# Patient Record
Sex: FEMALE | Race: WHITE
[De-identification: names, ages, dates, MRNs, and addresses within clinical notes are randomized per-mention and may not be internally consistent; named-entity substitution may affect disease eponyms.]

---

## 2017-08-14 ENCOUNTER — HOSPITAL ENCOUNTER (EMERGENCY)
Dept: HOSPITAL 10 - FTE | Age: 27
Discharge: HOME | End: 2017-08-14
Payer: MEDICAID

## 2017-08-14 VITALS
BODY MASS INDEX: 35.16 KG/M2 | BODY MASS INDEX: 35.16 KG/M2 | WEIGHT: 198.42 LBS | HEIGHT: 63 IN | HEIGHT: 63 IN | WEIGHT: 198.42 LBS

## 2017-08-14 VITALS
TEMPERATURE: 99.2 F | RESPIRATION RATE: 18 BRPM | DIASTOLIC BLOOD PRESSURE: 58 MMHG | HEART RATE: 101 BPM | SYSTOLIC BLOOD PRESSURE: 108 MMHG

## 2017-08-14 DIAGNOSIS — E86.0: ICD-10-CM

## 2017-08-14 DIAGNOSIS — J03.90: Primary | ICD-10-CM

## 2017-08-14 LAB
ADD UMIC: YES
COLOR UR: YELLOW
GLUCOSE UR STRIP-MCNC: NEGATIVE MG/DL
KETONES UR STRIP.AUTO-MCNC: NEGATIVE MG/DL
MUCOUS THREADS #/AREA URNS HPF: (no result) /HPF
NITRITE UR QL STRIP.AUTO: NEGATIVE MG/DL
RBC # UR AUTO: (no result) MG/DL
SQUAMOUS #/AREA URNS HPF: (no result) /HPF
UR ASCORBIC ACID: 20 MG/DL
UR BILIRUBIN (DIP): NEGATIVE MG/DL
UR CLARITY: (no result)
UR PH (DIP): 5 (ref 5–9)
UR RBC: 8 /HPF (ref 0–5)
UR SPECIFIC GRAVITY (DIP): 1.02 (ref 1–1.03)
UR TOTAL PROTEIN (DIP): (no result) MG/DL
UROBILINOGEN UR STRIP-ACNC: NEGATIVE MG/DL
WBC # UR STRIP: (no result) LEU/UL

## 2017-08-14 PROCEDURE — 96374 THER/PROPH/DIAG INJ IV PUSH: CPT

## 2017-08-14 PROCEDURE — 81001 URINALYSIS AUTO W/SCOPE: CPT

## 2017-08-14 PROCEDURE — 71010: CPT

## 2017-08-14 NOTE — RADRPT
PROCEDURE:   XR Chest. 

 

CLINICAL INDICATION:   Fevers. 

 

TECHNIQUE:   Single frontal chest x-ray. 

 

COMPARISON:   None. 

 

FINDINGS:

 

The lungs are clear.  No focal opacification is seen.  The cardiomediastinal silhouette is unremarka
ble.  The osseous structures are unremarkable.   

 

IMPRESSION:

 

1.  There is no acute cardiopulmonary process.

 

 

RPTAT: PP

_____________________________________________ 

.Joo Magana MD, MD           Date    Time 

Electronically viewed and signed by .Joo Magana MD, MD on 08/14/2017 19:23 

 

D:  08/14/2017 19:23  T:  08/14/2017 19:23

.B/

## 2017-08-14 NOTE — ERA
ER Documentation


Chief Complaint


Date/Time


DATE: 17 


TIME: 18:08


Chief Complaint


PT with fever, body aches, ST an dcongestions X 2 days.





HPI


The patient is a 27-year-old female, presenting to the ER because of fever, 

sore throat for 1 day, denies facial pain, neck pain, chest pain, dyspnea, 

abdominal pain, vomiting with dysuria, diarrhea.  She does not smoke or drink





Past medical history: None


Past surgical history: , cholecystectomy





ROS


All systems reviewed and are negative except as per history of present illness.





Medications


Home Meds


Active Scripts


Ibuprofen* (Motrin*) 600 Mg Tab, 600 MG PO Q6, #20 TAB


   Prov:JANE SALES MD         17


Azithromycin* (Zithromax*) 250 Mg Tablet, 250 MG PO .ZPACK AS DIRECTED, #6 TAB


   TAKE 500 MG (2 TABS) THE FIRST DAY THEN 250 MG (1 TAB) DAYS 2-5


   Prov:JANE SALES MD         17


Reported Medications


Acetaminophen* (Tylenol*) 325 Mg Tab, 325 MG PO AS NEEDED FOR PAIN


   11


[Pain Pills Unknown]   No Conflict Check


   11


[Denies]   No Conflict Check


   11





Allergies


Allergies:  


Coded Allergies:  


     Amoxicillin (Verified  Allergy, Mild, 11)


     Penicillins (Verified  Allergy, Mild, 11)





PMhx/Soc


History of Surgery:  Yes (C SECTION X1)


Anesthesia Reaction:  No


Hx Neurological Disorder:  No


Hx Respiratory Disorders:  No


Hx Cardiac Disorders:  No


Hx Psychiatric Problems:  No


Hx Miscellaneous Medical Probl:  No


Hx Alcohol Use:  No


Hx Substance Use:  No


Hx Tobacco Use:  No





Physical Exam


Vitals





Vital Signs








  Date Time  Temp Pulse Resp B/P Pulse Ox O2 Delivery O2 Flow Rate FiO2


 


17 17:43 104.0 130 18 127/68 98   








Physical Exam


 Const:      No acute distress.


 Head:        Atraumatic.


 Eyes:       Normal Conjunctiva.


 ENT:         Normal External Ears, Nose and Mouth.  Tonsils are edematous, 

erythematous, exudate


 Neck:        Full range of motion.  No meningismus.


 Resp:         Clear to auscultation bilaterally.


 Cardio:       Regular tachycardic


 Abd:         Soft,  non distended, normal bowel sounds, non tender.


 Skin:         No petechiae or rashes.


 Back:        No midline or flank tenderness.


 Ext:          No cyanosis, or edema.


 Neur:        Awake and alert. No focal deficit


 Psych:        Normal Mood and Affect.


Results 24 hrs








 Laboratory Tests








Test


  17


18:35


 


Urine Color YELLOW 


 


Urine Clarity


  SLIGHTLY


CLOUDY


 


Urine pH 5.0 


 


Urine Specific Gravity 1.019 


 


Urine Ketones NEGATIVEmg/dL 


 


Urine Nitrite NEGATIVEmg/dL 


 


Urine Bilirubin NEGATIVEmg/dL 


 


Urine Urobilinogen NEGATIVEmg/dL 


 


Urine Leukocyte Esterase TRACELeu/ul 


 


Urine Microscopic RBC 8/HPF 


 


Urine Microscopic WBC 6/HPF 


 


Urine Squamous Epithelial


Cells FEW/HPF 


 


 


Urine Mucus FEW/HPF 


 


Urine Hemoglobin 2+mg/dL 


 


Urine Glucose NEGATIVEmg/dL 


 


Urine Total Protein 1+mg/dl 








 Current Medications








 Medications


  (Trade)  Dose


 Ordered  Sig/Neeta


 Route


 PRN Reason  Start Time


 Stop Time Status Last Admin


Dose Admin


 


 Acetaminophen 650


 mg  650 mg  ONCE  ONCE


 PO


   17 18:30


 17 18:31 DC 17 18:29


 


 


 Sodium Chloride  1,000 ml @ 


 1,000 mls/hr  Q1H ONCE


 IV


   17 18:30


 17 19:29 DC 17 18:34


 


 


 Sodium Chloride


  (NS)  1,000 ml @ 


 1,000 mls/hr  Q1H ONCE


 IV


   17 18:30


 17 19:29 DC 17 18:41


 


 


 Ketorolac


 Tromethamine


  (Toradol)  30 mg  ONCE  STAT


 IV


   17 19:47


 17 19:48 DC 17 19:53


 














Procedures/[place] [id number]


 Radiology Main Line: [phone]





 DIAGNOSTIC IMAGING REPORT





 Patient: BRADEN AGUSTIN   : 1990   Age: 27  Sex: F                

        


 MR #:    [id number]   Acct #:   [id number]    DOS: 17 0000


 Ordering MD: JANE SALES MD   Location:  [facility]   Room/Bed:                  

                          


 








PROCEDURE:   XR Chest. 


 


CLINICAL INDICATION:   Fevers. 


 


TECHNIQUE:   Single frontal chest x-ray. 


 


COMPARISON:   None. 


 


FINDINGS:


 


The lungs are clear.  No focal opacification is seen.  The cardiomediastinal 

silhouette is unremarkable.  The osseous structures are unremarkable.   


 


IMPRESSION:


 


1.  There is no acute cardiopulmonary process.


 


 


RPTAT: PP


_____________________________________________ 


.Joo Magana MD, MD           Date    Time 


Electronically viewed and signed by .Joo Magana MD, MD on 2017 19:23 


 


D:  2017 19:23  T:  2017 19:23


.B/





CC: JANE SALES MD





MEDICAL MAKING DECISION: The patient is a 26-year-old female, presenting with 

acute tonsillitis, acute dehydration.  She was treated with 2 L normal saline 

for acute dehydration, Tylenol for fever and Toradol 30 mg IV for pain with 

good response





The differential diagnoses considered include but are not limited to pneumonia, 

cystitis, pyelonephritis





Departure


Diagnosis:  


 Primary Impression:  


 Tonsillitis


 Additional Impression:  


 Dehydration


Condition:  Good


Comments


She was discharged with amoxicillin and Motrin





I discussed the findings with the patient. I advised the patient to follow-up 

with the primary physician in about 1-2 days, sooner if needed and return if 

any concern.











JANE SALES MD Aug 14, 2017 18:08

## 2017-08-17 ENCOUNTER — HOSPITAL ENCOUNTER (EMERGENCY)
Age: 27
Discharge: HOME | End: 2017-08-17

## 2017-08-17 DIAGNOSIS — L50.9: ICD-10-CM

## 2017-08-17 DIAGNOSIS — J03.90: Primary | ICD-10-CM

## 2017-08-19 ENCOUNTER — HOSPITAL ENCOUNTER (EMERGENCY)
Dept: HOSPITAL 10 - FTE | Age: 27
Discharge: HOME | End: 2017-08-19
Payer: COMMERCIAL

## 2017-08-19 VITALS
WEIGHT: 190.39 LBS | HEIGHT: 63 IN | BODY MASS INDEX: 33.73 KG/M2 | HEIGHT: 63 IN | WEIGHT: 190.39 LBS | BODY MASS INDEX: 33.73 KG/M2

## 2017-08-19 VITALS — RESPIRATION RATE: 16 BRPM | DIASTOLIC BLOOD PRESSURE: 59 MMHG | HEART RATE: 86 BPM | SYSTOLIC BLOOD PRESSURE: 132 MMHG

## 2017-08-19 DIAGNOSIS — J03.90: Primary | ICD-10-CM

## 2017-08-19 LAB
ANION GAP SERPL CALC-SCNC: 20 MMOL/L (ref 8–16)
BASOPHILS # BLD AUTO: 0 10^3/UL (ref 0–0.1)
BASOPHILS NFR BLD: 0.3 % (ref 0–2)
BLAST% (M): 3 % (ref 0–0)
BLASTS # BLD MANUAL: 0 10^3/UL (ref 0–0)
BUN SERPL-MCNC: 13 MG/DL (ref 7–20)
CALCIUM SERPL-MCNC: 9.3 MG/DL (ref 8.4–10.2)
CHLORIDE SERPL-SCNC: 99 MMOL/L (ref 97–110)
CO2 SERPL-SCNC: 29 MMOL/L (ref 21–31)
CREAT SERPL-MCNC: 0.62 MG/DL (ref 0.44–1)
EOSINOPHIL # BLD: 0.3 10^3/UL (ref 0–0.5)
EOSINOPHIL NFR BLD MANUAL: 4 % (ref 0–7)
EOSINOPHIL NFR BLD: 1.4 % (ref 0–7)
ERYTHROCYTE [DISTWIDTH] IN BLOOD BY AUTOMATED COUNT: 12.3 % (ref 11.5–14.5)
GLUCOSE SERPL-MCNC: 93 MG/DL (ref 70–220)
HCT VFR BLD CALC: 34.6 % (ref 37–47)
HGB BLD-MCNC: 11.8 G/DL (ref 12–16)
LYMPHOCYTES # BLD AUTO: 2 10^3/UL (ref 0.8–2.9)
LYMPHOCYTES NFR BLD AUTO: 28.6 % (ref 15–51)
MCH RBC QN AUTO: 27.8 PG (ref 29–33)
MCHC RBC AUTO-ENTMCNC: 34.1 G/DL (ref 32–37)
MCV RBC AUTO: 81.4 FL (ref 82–101)
MONOCYTES # BLD: 0.3 10^3/UL (ref 0.3–0.9)
MONOCYTES % (M): 5 % (ref 0–11)
MONOCYTES NFR BLD: 5.2 % (ref 0–11)
NEUTROPHILS # BLD: 3.7 10^3/UL (ref 1.6–7.5)
NEUTROPHILS NFR BLD AUTO: 63.9 % (ref 39–77)
NRBC # BLD MANUAL: 0 10^3/UL (ref 0–0)
NRBC BLD AUTO-RTO: 0 /100WBC (ref 0–0)
PLATELET # BLD: 288 10^3/UL (ref 140–415)
PMV BLD AUTO: 8.9 FL (ref 7.4–10.4)
POSITIVE DIFF: (no result)
POTASSIUM SERPL-SCNC: 3.4 MMOL/L (ref 3.5–5.1)
RBC # BLD AUTO: 4.25 10^6/UL (ref 4.2–5.4)
SODIUM SERPL-SCNC: 145 MMOL/L (ref 135–144)
WBC # BLD AUTO: 6.5 10^3/UL (ref 4.8–10.8)

## 2017-08-19 PROCEDURE — 36415 COLL VENOUS BLD VENIPUNCTURE: CPT

## 2017-08-19 PROCEDURE — 87880 STREP A ASSAY W/OPTIC: CPT

## 2017-08-19 PROCEDURE — 85025 COMPLETE CBC W/AUTO DIFF WBC: CPT

## 2017-08-19 PROCEDURE — 80048 BASIC METABOLIC PNL TOTAL CA: CPT

## 2017-08-19 PROCEDURE — 70491 CT SOFT TISSUE NECK W/DYE: CPT

## 2017-08-19 NOTE — RADRPT
PROCEDURE:   CT neck with contrast. 

 

CLINICAL INDICATION:   Swelling and difficulty swallowing. 

 

TECHNIQUE:   CT of the neck was performed on a multi-detector high-resolution CT scanner.  Contiguou
s axial images were obtained after the administration of 90 cc Omnipaque 300 intravenous contrast.  
Coronal and sagittal reformatted images were obtained. Images were reviewed on a PACS workstation.

 

One or more of the following dose reduction techniques were used:

- Automated exposure control.

- Adjustment of the mA and/or kV according to patient size.

- Use of iterative reconstruction technique.

 

Exam CTD/vol = 9.52 mGy.

Total exam DLP = 227.33 mGy-cm.  

 

COMPARISON:   None. 

 

FINDINGS:

There are enlarged tonsils and adenoids.  There is no abnormal mass or fluid collection identified. 
 The nasopharynx, oropharynx, hypopharynx and larynx are otherwise within normal limits.  The tongue
 and tongue base are within normal limits.  The vallecula and piriform sinuses are within normal ervin
its.  Bilateral parotid and submandibular glands are within normal limits.  There are enlarged jugul
odigastric and submandibular lymph nodes bilaterally with the largest within the right cervical leve
l II measuring 2.1 x 2.0 cm.  The thyroid gland is unremarkable.  There is minimal mucoperiosteal di
sease within bilateral maxillary and sphenoid sinuses.  Bilateral mastoid air cells are clear.  The 
lung apices are clear. 

 

IMPRESSION:

Enlarged tonsils and adenoids. There is no abnormal mass or fluid collection identified.

 

Mild to moderate cervical lymphadenopathy.

 

Minimal paranasal sinus inflammatory disease.

_____________________________________________ 

.Naga Finney MD, MD           Date    Time 

Electronically viewed and signed by .Naga Finney MD, MD on 08/19/2017 05:43 

 

D:  08/19/2017 05:43  T:  08/19/2017 05:43

.T/

## 2017-08-19 NOTE — ERD
ER Documentation


Chief Complaint


Date/Time


DATE: 17 


TIME: 05:11


Chief Complaint


swollen upper lip&rash over arms & tummy today,no SOB,on Cipro&Z-pack





HPI


This is a 27-year-old female presenting to the emergency department with sore 

throat and neck pain x 1 week.  Patient states she was here twice before and 

was started on 2 different antibiotics.  Patient states the first time she was 

here she was started on azithromycin and developed a pruritic rash to neck and 

chest.  Patient was then switched to clindamycin and continues to have rash 

that is spreading and swelling to upper lip and face.  No difficulty swallowing 

or drooling.  No difficulty breathing or shortness of breath.  Patient states 

she continues to have sore throat.  Patient has muffled voice.  Patient talking 

complete sentences.  No fevers or chills.





ROS


All systems reviewed and are negative except as per history of present illness.





Medications


Home Meds


Active Scripts


Hydrocodone/Acetaminophen (Norco 5-325 Tablet) 1 Each Tablet, 1 TAB PO Q6H Y 

for PAIN, #15 TAB


   Prov:MUSA WILDE NP         17


Triamcinolone Acetonide (Triamcinolone Acetonide) 0.1% - 15 Gm Cream.gm., 1 

APPLIC TOP BID, #1 TUB


   Prov:ANGY SANCHEZ PA-C         17


Acetaminophen* (Tylophen*) 500 Mg Capsule, 1 CAP PO Q6H Y for PAIN AND OR 

ELEVATED TEMP, #20 CAP


   Prov:ANGY SANCHEZ-C         17


Famotidine* (Pepcid*) 20 Mg Tablet, 20 MG PO BID for 4 Days, TAB


   Prov:ANGY SANCHEZ-C         17


Diphenhydramine Hcl* (Benadryl*) 25 Mg Cap, 25 MG PO Q6, #30 CAP


   Prov:ANGY SANCHEZ-HARLEEN         17


Clindamycin Hcl* (Clindamycin Hcl*) 300 Mg Capsule, 300 MG PO TID for 10 Days, 

CAP


   Prov:ANGY SANCHEZ-C         17


Ibuprofen* (Motrin*) 600 Mg Tab, 600 MG PO Q6, #20 TAB


   Prov:JANE SALES MD         17


Azithromycin* (Zithromax*) 250 Mg Tablet, 250 MG PO .ZPACK AS DIRECTED, #6 TAB


   TAKE 500 MG (2 TABS) THE FIRST DAY THEN 250 MG (1 TAB) DAYS 2-5


   Prov:JANE SALES MD         17


Reported Medications


Acetaminophen* (Tylenol*) 325 Mg Tab, 325 MG PO AS NEEDED FOR PAIN


   11


[Pain Pills Unknown]   No Conflict Check


   11


[Denies]   No Conflict Check


   11





Allergies


Allergies:  


Coded Allergies:  


     Amoxicillin (Verified  Allergy, Mild, 11)


     Penicillins (Verified  Allergy, Mild, 11)





PMhx/Soc


History of Surgery:  Yes (C SECTION X2, gallbladder)


Anesthesia Reaction:  No


Hx Neurological Disorder:  No


Hx Respiratory Disorders:  No


Hx Cardiac Disorders:  No


Hx Psychiatric Problems:  No


Hx Miscellaneous Medical Probl:  No


Hx Alcohol Use:  No


Hx Substance Use:  No


Hx Tobacco Use:  No


Smoking Status:  Never smoker





Physical Exam


Vitals





Vital Signs








  Date Time  Temp Pulse Resp B/P Pulse Ox O2 Delivery O2 Flow Rate FiO2


 


17 01:14 97.1 93 18 120/58 97   








Physical Exam


Const:              No acute distress, alert


Head:   Atraumatic 


Eyes:    Normal Conjunctiva


ENT:    Normal External Ears, Nose and Mouth.  Kissing tonsils, exudate and 

erythema to bilateral tonsils.  No peritonsillar abscess. 


Neck:               Full range of motion..~ No meningismus.


Resp:    Clear to auscultation bilaterally


Cardio:    Regular rate and rhythm, no murmurs


Abd:    Soft, non tender, non distended. Normal bowel sounds


Skin:    No petechiae or rashes


Back:    No midline or flank tenderness


Ext:    No cyanosis, or edema


Neur:    Awake and alert


Psych:    Normal Mood and Affect


Result Diagram:  


17 0348                                                                   

             17 0348





Results 24 hrs





 Laboratory Tests








Test


  17


03:48


 


White Blood Count 6.510^3/ul 


 


Red Blood Count 4.2510^6/ul 


 


Hemoglobin 11.8g/dl 


 


Hematocrit 34.6% 


 


Mean Corpuscular Volume 81.4fl 


 


Mean Corpuscular Hemoglobin 27.8pg 


 


Mean Corpuscular Hemoglobin


Concent 34.1g/dl 


 


 


Red Cell Distribution Width 12.3% 


 


Platelet Count 99984^3/UL 


 


Mean Platelet Volume 8.9fl 


 


Neutrophils % % 


 


Lymphocytes % % 


 


Monocytes % % 


 


Eosinophils % % 


 


Basophils % % 


 


Nucleated Red Blood Cells % 0.0/100WBC 


 


Neutrophils # (Manual) 410^3/ul 


 


Lymphocytes # 10^3/ul 


 


Monocytes # 10^3/ul 


 


Eosinophils # 10^3/ul 


 


Basophils # 10^3/ul 


 


Nucleated Red Blood Cells # 10^3/ul 


 


Sodium Level 145mmol/L 


 


Potassium Level 3.4mmol/L 


 


Chloride Level 99mmol/L 


 


Carbon Dioxide Level 29mmol/L 


 


Anion Gap 20 


 


Blood Urea Nitrogen 13mg/dl 


 


Creatinine 0.62mg/dl 


 


Glucose Level 93mg/dl 


 


Calcium Level 9.3mg/dl 








 Current Medications








 Medications


  (Trade)  Dose


 Ordered  Sig/Neeta


 Route


 PRN Reason  Start Time


 Stop Time Status Last Admin


Dose Admin


 


 IV Flush 10 ml  10 ml  STK-MED ONCE


 .ROUTE


   17 05:10


 17 05:11 DC 17 05:20


 


 


 Sodium Chloride


  (NS)  100 ml @ ud  STK-MED ONCE


 .ROUTE


   17 05:10


 17 05:11 DC 17 05:20


 


 


 Iohexol


  (Omnipaque 300mg/


 ml)  150 ml  STK-MED ONCE


 .ROUTE


   17 05:10


 17 05:11 DC 17 05:20


 











Procedures/[place] [id number]


 Radiology Main Line: [phone]





 DIAGNOSTIC IMAGING REPORT





 Patient: BRADEN AGUSTIN   : 1990   Age: 27  Sex: F                

        


 MR #:    [id number]   [id number]    DOS: 17 0300


 Ordering MD: MUSA WILDE NP   Location:  [facility]   Room/Bed:                    

                        


 








PROCEDURE:   CT neck with contrast. 


 


CLINICAL INDICATION:   Swelling and difficulty swallowing. 


 


TECHNIQUE:   CT of the neck was performed on a multi-detector high-resolution 

CT scanner.  Contiguous axial images were obtained after the administration of 

90 cc Omnipaque 300 intravenous contrast.  Coronal and sagittal reformatted 

images were obtained. Images were reviewed on a PACS workstation.


 


One or more of the following dose reduction techniques were used:


- Automated exposure control.


- Adjustment of the mA and/or kV according to patient size.


- Use of iterative reconstruction technique.


 


Exam CTD/vol = 9.52 mGy.


Total exam DLP = 227.33 mGy-cm.  


 


COMPARISON:   None. 


 


FINDINGS:


There are enlarged tonsils and adenoids.  There is no abnormal mass or fluid 

collection identified.  The nasopharynx, oropharynx, hypopharynx and larynx are 

otherwise within normal limits.  The tongue and tongue base are within normal 

limits.  The vallecula and piriform sinuses are within normal limits.  

Bilateral parotid and submandibular glands are within normal limits.  There are 

enlarged jugulodigastric and submandibular lymph nodes bilaterally with the 

largest within the right cervical level II measuring 2.1 x 2.0 cm.  The thyroid 

gland is unremarkable.  There is minimal mucoperiosteal disease within 

bilateral maxillary and sphenoid sinuses.  Bilateral mastoid air cells are 

clear.  The lung apices are clear. 


 


IMPRESSION:


Enlarged tonsils and adenoids. There is no abnormal mass or fluid collection 

identified.


 


Mild to moderate cervical lymphadenopathy.


 


Minimal paranasal sinus inflammatory disease.





MDM:  27 year old female presents to ER with neck pain and sore throat.  

Patient was started antibiotics on 2 separate occasions for tonsillitis and 

continues to have sore throat.  Patient was first started on azithromycin and 

developed an allergic reaction and then started on clindamycin.  Patient 

continues to have pruritic rash to neck and upper back.  Because this is patient

's third visit with same symptoms, labs and IV ordered.  IV access obtained per 

RN staff.  CBC shows no significant anemia or infection.  BMP shows no 

significant electrolyte imbalance.  Rapid strep test is negative.  CT neck soft 

tissue with contrast reviewed by radiologist as enlarged tonsils and adenoids.  

There is no abnormal mass or fluid collection identified.  Mild to moderate 

cervical lymphadenopathy.  Minimal paranasal sinus inflammatory disease.  

Patient remains alert and oriented throughout ED visit.  In no acute distress.  

Patient is talking normally.  No difficulty swallowing or drooling.  Vital 

signs are stable.





Differential diagnosis includes but not limited to strep pharyngitis, pneumonia

, viral pharyngitis, influenza, coxsackievirus, herpes simplex virus, Cordell-

Barr virus, Respiratory syncytial virus and otitis media.  Patient likely has 

viral pharyngitis.





Patient is appropriate for outpatient management and will be discharged with 

prescription for Motrin. Instructed patient to follow up with primary care 

provider in the next 2-3 days for reassessment.Return to ED for any high fever, 

chest pain, difficulty breathing, shortness breath, wheezing, vomiting, diarrhea

, abdominal pain or any new or worsening symptoms. Patient verbalizes 

understanding. All questions answered at discharge.





Departure


Diagnosis:  


 Primary Impression:  


 Tonsillitis


Condition:  Stable











MUSA WILDE NP Aug 19, 2017 05:15

## 2018-02-21 ENCOUNTER — HOSPITAL ENCOUNTER (EMERGENCY)
Dept: HOSPITAL 91 - FTE | Age: 28
LOS: 1 days | Discharge: HOME | End: 2018-02-22
Payer: COMMERCIAL

## 2018-02-21 ENCOUNTER — HOSPITAL ENCOUNTER (EMERGENCY)
Age: 28
LOS: 1 days | Discharge: HOME | End: 2018-02-22

## 2018-02-21 DIAGNOSIS — N94.0: Primary | ICD-10-CM

## 2018-02-21 PROCEDURE — 99284 EMERGENCY DEPT VISIT MOD MDM: CPT

## 2018-02-21 PROCEDURE — 96372 THER/PROPH/DIAG INJ SC/IM: CPT

## 2018-02-21 PROCEDURE — 81003 URINALYSIS AUTO W/O SCOPE: CPT

## 2018-02-22 LAB
URINE BLOOD (DIP) POC: (no result)
URINE PH (DIP) POC: 6 (ref 5–8.5)

## 2018-02-22 RX ADMIN — KETOROLAC TROMETHAMINE 1 MG: 30 INJECTION, SOLUTION INTRAMUSCULAR at 00:09
